# Patient Record
Sex: MALE | Race: BLACK OR AFRICAN AMERICAN
[De-identification: names, ages, dates, MRNs, and addresses within clinical notes are randomized per-mention and may not be internally consistent; named-entity substitution may affect disease eponyms.]

---

## 2020-02-17 ENCOUNTER — HOSPITAL ENCOUNTER (EMERGENCY)
Dept: HOSPITAL 62 - ER | Age: 25
Discharge: HOME | End: 2020-02-17
Payer: OTHER GOVERNMENT

## 2020-02-17 VITALS — SYSTOLIC BLOOD PRESSURE: 134 MMHG | DIASTOLIC BLOOD PRESSURE: 77 MMHG

## 2020-02-17 DIAGNOSIS — R59.0: ICD-10-CM

## 2020-02-17 DIAGNOSIS — R10.32: ICD-10-CM

## 2020-02-17 DIAGNOSIS — R30.9: Primary | ICD-10-CM

## 2020-02-17 DIAGNOSIS — Z20.2: ICD-10-CM

## 2020-02-17 DIAGNOSIS — R10.814: ICD-10-CM

## 2020-02-17 LAB
ADD MANUAL DIFF: NO
ALBUMIN SERPL-MCNC: 4.5 G/DL (ref 3.5–5)
ALP SERPL-CCNC: 68 U/L (ref 38–126)
ANION GAP SERPL CALC-SCNC: 9 MMOL/L (ref 5–19)
APPEARANCE UR: CLEAR
APTT PPP: YELLOW S
AST SERPL-CCNC: 31 U/L (ref 17–59)
BASOPHILS # BLD AUTO: 0 10^3/UL (ref 0–0.2)
BASOPHILS NFR BLD AUTO: 0.4 % (ref 0–2)
BILIRUB DIRECT SERPL-MCNC: 0.2 MG/DL (ref 0–0.4)
BILIRUB SERPL-MCNC: 0.5 MG/DL (ref 0.2–1.3)
BILIRUB UR QL STRIP: NEGATIVE
BUN SERPL-MCNC: 12 MG/DL (ref 7–20)
CALCIUM: 9.8 MG/DL (ref 8.4–10.2)
CHLAM PCR: NOT DETECTED
CHLORIDE SERPL-SCNC: 101 MMOL/L (ref 98–107)
CO2 SERPL-SCNC: 30 MMOL/L (ref 22–30)
EOSINOPHIL # BLD AUTO: 0.2 10^3/UL (ref 0–0.6)
EOSINOPHIL NFR BLD AUTO: 4.9 % (ref 0–6)
ERYTHROCYTE [DISTWIDTH] IN BLOOD BY AUTOMATED COUNT: 13.2 % (ref 11.5–14)
GLUCOSE SERPL-MCNC: 93 MG/DL (ref 75–110)
GLUCOSE UR STRIP-MCNC: NEGATIVE MG/DL
HCT VFR BLD CALC: 43.7 % (ref 37.9–51)
HGB BLD-MCNC: 15 G/DL (ref 13.5–17)
KETONES UR STRIP-MCNC: NEGATIVE MG/DL
LYMPHOCYTES # BLD AUTO: 1.6 10^3/UL (ref 0.5–4.7)
LYMPHOCYTES NFR BLD AUTO: 35.2 % (ref 13–45)
MCH RBC QN AUTO: 30 PG (ref 27–33.4)
MCHC RBC AUTO-ENTMCNC: 34.3 G/DL (ref 32–36)
MCV RBC AUTO: 88 FL (ref 80–97)
MONOCYTES # BLD AUTO: 0.6 10^3/UL (ref 0.1–1.4)
MONOCYTES NFR BLD AUTO: 14.3 % (ref 3–13)
NEUTROPHILS # BLD AUTO: 2 10^3/UL (ref 1.7–8.2)
NEUTS SEG NFR BLD AUTO: 45.2 % (ref 42–78)
NITRITE UR QL STRIP: NEGATIVE
PH UR STRIP: 6 [PH] (ref 5–9)
PLATELET # BLD: 246 10^3/UL (ref 150–450)
POTASSIUM SERPL-SCNC: 4.4 MMOL/L (ref 3.6–5)
PROT SERPL-MCNC: 7.8 G/DL (ref 6.3–8.2)
PROT UR STRIP-MCNC: NEGATIVE MG/DL
RBC # BLD AUTO: 4.99 10^6/UL (ref 4.35–5.55)
SP GR UR STRIP: 1.01
TOTAL CELLS COUNTED % (AUTO): 100 %
UROBILINOGEN UR-MCNC: NEGATIVE MG/DL (ref ?–2)
WBC # BLD AUTO: 4.5 10^3/UL (ref 4–10.5)

## 2020-02-17 PROCEDURE — 85025 COMPLETE CBC W/AUTO DIFF WBC: CPT

## 2020-02-17 PROCEDURE — 87491 CHLMYD TRACH DNA AMP PROBE: CPT

## 2020-02-17 PROCEDURE — 96372 THER/PROPH/DIAG INJ SC/IM: CPT

## 2020-02-17 PROCEDURE — 93976 VASCULAR STUDY: CPT

## 2020-02-17 PROCEDURE — 76870 US EXAM SCROTUM: CPT

## 2020-02-17 PROCEDURE — 81001 URINALYSIS AUTO W/SCOPE: CPT

## 2020-02-17 PROCEDURE — 87591 N.GONORRHOEAE DNA AMP PROB: CPT

## 2020-02-17 PROCEDURE — 36415 COLL VENOUS BLD VENIPUNCTURE: CPT

## 2020-02-17 PROCEDURE — 99284 EMERGENCY DEPT VISIT MOD MDM: CPT

## 2020-02-17 PROCEDURE — 80053 COMPREHEN METABOLIC PANEL: CPT

## 2020-02-17 NOTE — ER DOCUMENT REPORT
ED General





- General


Chief Complaint: Groin Pain


Stated Complaint: PELVIC PAIN,PAIN URINATING


Time Seen by Provider: 02/17/20 07:49


Mode of Arrival: Ambulatory


Information source: Patient


Notes: 





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 

complaints of lower abdominal pain for the past 2 days.  Also complains of pain 

with void.  Denies testicular pain.  Denies penile discharge.  Denies urinary 

frequency or urgency.  Reports left lower quad tender.  Denies fever nausea 

vomiting diarrhea.  Reports he just got back from a deployment.  He reports has 

not been sexually active for 6 months, does not think he has STD.  Reports he is

a boxer.  Denies recent trauma.  He reports he went to camp [facility] this morning for this pain with void but they treated him with Motrin 

and discharged home.  He has discharge instructions with him that note STD 

testing but no results yet.


TRAVEL OUTSIDE OF THE [place] IN LAST 30 DAYS: No





- HPI


Onset: Other - 2 days


Onset/Duration: Persistent


Quality of pain: Burning, Sharp


Associated symptoms: None


Exacerbated by: Other - Voiding


Relieved by: Denies


Similar symptoms previously: Yes


Recently seen / treated by doctor: Yes





- Related Data


Allergies/Adverse Reactions: 


                                        





No Known Allergies Allergy (Verified 02/17/20 06:12)


   











Past Medical History





- General


Information source: Patient





- Social History


Smoking Status: Never Smoker


Cigarette use (# per day): No


Frequency of alcohol use: None


Drug Abuse: None


Occupation: Active-duty [facility]


Family History: None


Patient has suicidal ideation: No


Patient has homicidal ideation: No





- Medical History


Medical History: Negative


Past Surgical History: Reports: Hx Nose Surgery





Review of Systems





- Review of Systems


Notes: 





Review HPI for review of systems., All other systems negative





Physical Exam





- Vital signs


Vitals: 


                                        











Temp Pulse Resp BP Pulse Ox


 


 98.2 F   68   16   146/68 H  99 


 


 02/17/20 05:23  02/17/20 05:23  02/17/20 05:23  02/17/20 05:23  02/17/20 05:23














- General


General appearance: Appears well, Alert, Anxious


In distress: None





- HEENT


Head: Normocephalic


Eyes: Normal


Conjunctiva: Normal


Extraocular movements intact: Yes


Mucous membranes: Moist


Neck: Normal, Supple.  No: Lymphadenopathy





- Respiratory


Respiratory status: No respiratory distress


Chest status: Nontender


Breath sounds: Normal


Chest palpation: Normal





- Cardiovascular


Rhythm: Regular


Heart sounds: Normal auscultation


Murmur: No





- Abdominal


Inspection: Normal


Distension: No distension


Bowel sounds: Normal


Tenderness: Tender - mid LLQ abd pain


Organomegaly: No organomegaly





- Genitourinary


Notes: 





left inguinal lymph node swelling





- Back


Back: Normal, Nontender





- Extremities


General upper extremity: Normal ROM


General lower extremity: Normal ROM, Normal weight bearing





- Neurological


Neuro grossly intact: Yes


Cognition: Normal


Orientation: AAOx4


[place] Coma Scale Eye Opening: Spontaneous


[place] Coma Scale Verbal: Oriented


Tito Coma Scale Motor: Obeys Commands


[place]ito Coma Scale Total: 15


Speech: Normal





- Psychological


Associated symptoms: Normal affect, Normal mood





- Skin


Skin Temperature: Warm


Skin Moisture: Dry


Skin Color: Normal





Course





- Re-evaluation


Re-evalutation: 





02/17/20 09:49


24-year-old male presents emergency department with complaints of pain with void

for the past 2 days.  He was evaluated at [facility] for same symptoms prior

to arrival here with STD results pending.  Patient reports they treated him with

Motrin which was going to help him.  Patient reports he is not been sexually 

active for 6 months.  He denies symptoms while he was deployed.  Labs are 

unremarkable testicular ultrasound unremarkable.





                                        





Scrotum Ultrasound  02/17/20 08:11


IMPRESSION:  1.  No testicular mass or evidence for testicular torsion.


 


2. No epididymitis.


3.  No sizable hydrocele or varicocele.


 











Laboratory











  02/17/20 02/17/20 02/17/20





  06:03 08:30 08:30


 


WBC   4.5 


 


RBC   4.99 


 


Hgb   15.0 


 


Hct   43.7 


 


MCV   88 


 


MCH   30.0 


 


MCHC   34.3 


 


RDW   13.2 


 


Plt Count   246 


 


Lymph % (Auto)   35.2 


 


Mono % (Auto)   14.3 H 


 


Eos % (Auto)   4.9 


 


Baso % (Auto)   0.4 


 


Absolute Neuts (auto)   2.0 


 


Absolute Lymphs (auto)   1.6 


 


Absolute Monos (auto)   0.6 


 


Absolute Eos (auto)   0.2 


 


Absolute Basos (auto)   0.0 


 


Seg Neutrophils %   45.2 


 


Sodium    139.5


 


Potassium    4.4


 


Chloride    101


 


Carbon Dioxide    30


 


Anion Gap    9


 


BUN    12


 


Creatinine    0.89


 


Est GFR ( Amer)    > 60


 


Est GFR (MDRD) Non-Af    > 60


 


Glucose    93


 


Calcium    9.8


 


Total Bilirubin    0.5


 


Direct Bilirubin    0.2


 


Neonat Total Bilirubin    Not Reportable


 


Neonat Direct Bilirubin    Not Reportable


 


Neonat Indirect Bili    Not Reportable


 


AST    31


 


ALT    19


 


Alkaline Phosphatase    68


 


Total Protein    7.8


 


Albumin    4.5


 


Urine Color  YELLOW  


 


Urine Appearance  CLEAR  


 


Urine pH  6.0  


 


Ur Specific Gravity  1.015  


 


Urine Protein  NEGATIVE  


 


Urine Glucose (UA)  NEGATIVE  


 


Urine Ketones  NEGATIVE  


 


Urine Blood  NEGATIVE  


 


Urine Nitrite  NEGATIVE  


 


Urine Bilirubin  NEGATIVE  


 


Urine Urobilinogen  NEGATIVE  


 


Ur Leukocyte Esterase  TRACE H  


 


Urine WBC (Auto)  9  


 


Urine RBC (Auto)  3  


 


Urine Ascorbic Acid  NEGATIVE  











02/17/20 09:49





Patient was instructed on all labs unremarkable ultrasound unremarkable.  We 

discussed possible STD.  He reports he would like to be treated for STD and he 

will call later for results.  He was instructed on the importance of follow-up 

with his BAS.  He verbalized understanding to all instructions.





02/17/20 12:30


STD cultures negative





- Vital Signs


Vital signs: 


                                        











Temp Pulse Resp BP Pulse Ox


 


 97.5 F   63   16   134/77 H  99 


 


 02/17/20 10:50  02/17/20 10:50  02/17/20 10:50  02/17/20 10:50  02/17/20 10:50














- Laboratory


Result Diagrams: 


                                 02/17/20 08:30





                                 02/17/20 08:30


Laboratory results interpreted by me: 


                                        











  02/17/20 02/17/20





  06:03 08:30


 


Mono % (Auto)   14.3 H


 


Ur Leukocyte Esterase  TRACE H 














- Diagnostic Test


Radiology reviewed: Image reviewed, Reports reviewed





Discharge





- Discharge


Clinical Impression: 


 Voiding pain, Possible exposure to STD





Condition: Stable


Disposition: HOME, SELF-CARE


Instructions:  Azithromycin ([facility]), Chlamydia ([facility]), Gonorrhea ([facility]), [facility], Rocephin ([facility]), Urinary Anesthetic Agent ([facility])


Additional Instructions: 


*You have been evaluated for pain while voiding, possible STD exposure


*You may contact [facility] at [phone] for your STD the results in 2 hours.


*You have been treated for gonorrhea and chlamydia with Rocephin and Zithromax.


*Take Pyridium as prescribed for pain with void


*Push fluids


*Follow up with your BAS within 1 week for recheck


*Return to ED for worsening condition, changes, needs








Monitor your blood pressure. Your blood pressure was elevated today.  This may 

be because you were anxious, in pain or because you need medication.  It is 

important to follow up with your primary care provider for full evaluation.


Prescriptions: 


Phenazopyridine HCl [Pyridium 200 mg Tablet] 200 mg PO TID #15 tablet


Forms:  Elevated Blood Pressure, Return to Work

## 2020-02-17 NOTE — RADIOLOGY REPORT (SQ)
EXAM DESCRIPTION:  U/S SCROTUM W/DOPPLER



COMPLETED DATE/TIME:  2/17/2020 9:13 am



REASON FOR STUDY:  pain with void



COMPARISON:  None.



TECHNIQUE:  Static and realtime gray scale imaging of the scrotum and testes.  Selected color Doppler
 and spectral images recorded to document blood flow.



LIMITATIONS:  None.



FINDINGS:  RIGHT:

TESTICLE: The right testicle measures 3.6 x 2.9 x 2.5 cm.  The echotexture of the testicular parenchy
ma is homogeneous and on Doppler there is intact arterial inflow and venous outflow within it.  There
 is no testicular mass.

EPIDIDYMIS: Normal.

HYDROCELE OR VARICOCELE: No.

HERNIA OR EXTRA-TESTICULAR MASS: No.

OTHER: No other findings.

LEFT:

TESTICLE: The left testicle measures 3.8 x 2.9 by 1.8 cm.  The echotexture of the testicular parenchy
ma is homogeneous and on Doppler there is intact arterial inflow and venous outflow within it.  There
 is no testicular mass.

EPIDIDYMIS: Normal.

HYDROCELE OR VARICOCELE: No.

HERNIA OR EXTRA-TESTICULAR MASS: No.

OTHER: No other finding.



IMPRESSION:  1.  No testicular mass or evidence for testicular torsion.



2. No epididymitis.

3.  No sizable hydrocele or varicocele.



TECHNICAL DOCUMENTATION:  JOB ID:  [id number]

 2011 [employer]- All Rights Reserved



Reading location - IP/workstation name: [email or web address]